# Patient Record
(demographics unavailable — no encounter records)

---

## 2025-02-07 NOTE — PHYSICAL EXAM
[FreeTextEntry1] : Constitutional: In NAD, calm and cooperative Heart: well perfused Lungs: nonlabored breathing MSK  Inspection: no gross swelling identified, no scars Palpation: Tenderness of the bilateral lower cervical paraspinals, upper traps, levator scap, rhomboids, lower lumbar paraspinals, QLs and gluteus muscles,  focal areas of hyperirritable, palpable, taut bands of muscles that reproduce pain referral pattern and twitch response with palpation ROM: Pain at end cervical rotation and sidebending, lumbar flexion and rotation  Strength: 5/5 strength in bilateral upper and lower extremities Reflexes: 2+ Patella reflex bilaterally, 2+ Achilles reflex bilaterally, negative clonus bilaterally Sensation: Intact to light touch in bilateral lower extremities Special tests: Spurling's: neg BL SLR: neg BL MARY: neg BL FADIR: neg BL Nilda's Finger: neg BL Facet loading: neg BL

## 2025-02-07 NOTE — ASSESSMENT
[FreeTextEntry1] : Mr. MARIA COULTER is a 46 year male who presents with chronic neck and low back pain. No focal deficits on exam. Reviewed with patient that the likely etiology of his pain is due to myofascial pain. At this time, lumbar/gluteal pain is slightly worse than neck/upper shoulder pain. Patient is amenable to lumbar TPI today, and maximizing conservative management for his overall myofascial pain     Impression: low back pain  neck pain myofascial pain   Plan:  - ortho notes reviewed  - XR L spine reviewed  - TPI performed, procedure note above.  - PT referral provided, emphasized on compliance to HEP. - start celecoxib 100mg q12h PRN, rx sent - start methocarbamol 500mg BID PRN, rx sent.  - RTC in 1 week for cervical TPI    The patient expressed verbal understanding and is in agreement with the plan of care. All of the patient's questions and concerns were addressed during today's visit.

## 2025-02-07 NOTE — REVIEW OF SYSTEMS
[Fever] : no fever [Chills] : no chills [Chest Pain] : no chest pain [Shortness Of Breath] : no shortness of breath [FreeTextEntry9] : +back pain, +neck pain

## 2025-02-07 NOTE — HISTORY OF PRESENT ILLNESS
[FreeTextEntry1] : Mr. MARIA COULTER is a 46 year male who presents with back pain      HPI  02/06/2025 Location: low back  Onset: 1 year ago Provocation/Palliative: Quality: Radiation: Severity:  /10   Denies any associated numbness. Denies any associated leg weakness. Denies any loss of bowel/bladder control or any groin numbness.   Current pain medications:   Previous medications trialed:   Previous procedures relevant to complaint: Injections:   5/16/24 - lumbar/gluteal TPI (ortho) Surgery: none for spine or joints    Conservative therapy tried: Physical Therapy: HEP: Chiropractor: Acupuncture:     Diagnostic studies reviewed by me: XR L spine:

## 2025-02-07 NOTE — HISTORY OF PRESENT ILLNESS
[FreeTextEntry1] : Mr. MARIA COULTER is a 46 year male who presents with back pain   HPI 02/06/2025 Location: low back/glutes, neck  Onset: chronic for many years, patient served in [place], wore heavy gear. 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  Provocation/Palliative: worse with pulling, twisting, lifting.  Quality: tight, aching.  Radiation: nonradiating. Severity: 6-7/10  Denies any associated numbness. Denies any associated leg weakness. Denies any loss of bowel/bladder control or any groin numbness.  Current pain medications: -   Previous medications trialed: ibuprofen ketorolac   Previous procedures relevant to complaint: Injections: 5/16/24 - lumbar/gluteal TPI (ortho) - helpful  Surgery: none for spine or joints  Conservative therapy tried: Physical Therapy: + completed in the past for back pain  HEP: stretches and exercises at home  Chiropractor: none  Acupuncture: none    Diagnostic studies reviewed by me: XR L spine (orthopacs):  no fracture or listhesis, no significant degenerative changes. maintenance of lumbar lordotic curve.

## 2025-02-07 NOTE — ASSESSMENT
[FreeTextEntry1] : Mr. MARIA COULTER is a 46 year male who presents with chronic low back pain    Impression: low back pain   Plan:  - Ortho notes reviewed  - XR L spine reviewed  -     The patient expressed verbal understanding and is in agreement with the plan of care. All of the patient's questions and concerns were addressed during today's visit.

## 2025-02-07 NOTE — REVIEW OF SYSTEMS
[Fever] : no fever [Chills] : no chills [Chest Pain] : no chest pain [Shortness Of Breath] : no shortness of breath [FreeTextEntry9] : +low back pain

## 2025-02-07 NOTE — PROCEDURE
[de-identified] : Procedure: Trigger point injection Preoperative Diagnosis: Myofascial Pain Post Procedure Diagnosis: Myofascial Pain Findings: Radiating trigger points Complications: None Anesthetic: bupivacaine 0.25% 0.5-1 ml per site Indication:  hx of myofascial pain.  Informed consent and Time Out performed prior to initiation of procedure. Technical details: Sterilization with alcohol to skin sites. Injection into the trigger point with 1.25 inch 27G needle using needling technique to elicit twitch response. Muscle groups: bilateral lumbar paraspinals, QL and gluteal muscles  Total injections: 10 Pre-procedure pain: 8 Post-procedure pain: 1 Dispo: The patient tolerated the procedure well.

## 2025-02-07 NOTE — PHYSICAL EXAM
[FreeTextEntry1] : Constitutional: In NAD, calm and cooperative Heart: well perfused Lungs: nonlabored breathing MSK (Back) Inspection: no gross swelling identified, no scars Palpation: Tenderness of the bilateral lower lumbar paraspinals ROM: Pain at end lumbar extension/flexion Strength: 5/5 strength in bilateral lower extremities Reflexes: 2+ Patella reflex bilaterally, 2+ Achilles reflex bilaterally, negative clonus bilaterally Sensation: Intact to light touch in bilateral lower extremities Special tests: SLR: MARY: FADIR: Pelvic Compression:  Thigh Thrust: Nilda's Finger: Facet loading:

## 2025-03-11 NOTE — PROCEDURE
[de-identified] : Procedure: Trigger point injection Preoperative Diagnosis: Myofascial Pain Post Procedure Diagnosis: Myofascial Pain Findings: Radiating trigger points Complications: None Anesthetic: bupivacaine 0.25% 1 ml per site Indication:  hx of myofascial pain.  Informed consent and Time Out performed prior to initiation of procedure. Technical details: Sterilization with alcohol to skin sites. Injection into the trigger point with 1.25 inch 27G needle using needling technique to elicit twitch response. Muscle groups: bilateral lumbar paraspinals, QLs and glutes Total injections: 10 Pre-procedure pain: 7 Post-procedure pain: 1 Dispo: The patient tolerated the procedure well.

## 2025-03-11 NOTE — HISTORY OF PRESENT ILLNESS
[FreeTextEntry1] : Mr. MARIA COULTER is a 46 year male who presents with back pain and neck pain  Interval 03/11/2025  Mr. MARIA COULTER is a 46 year male presents for follow up for neck/low back pain. At last visit cervical TPI was performed, PT, methocarbamol and celecoxib were continued. He states that neck pain has improved, feels tightness, but not as sharp and with improved ROM. He states that over the past 1 week, low back/gluteal pain returned to 7-8/10 due to increased life stressors. Pain is aggravated by heavy lifting, twisting, pulling that he performs at his job as a . He has been attending PT and doing his home exercises. He takes celecoxib and methocarbamol in the mornings, and only takes the second dose as needed. Denies any associated numbness. Denies any associated leg weakness. Denies any loss of bowel/bladder control or any groin numbness.    Interval 02/11/2025  Mr. MARIA COULTER is a 46 year male who presents for follow up for neck and low back pain. At last visit, lumbar/gluteal TPI was performed. Celecoxib and methocarbamol were started. He reports significant improvement of his bilateral gluteal pain since TPI, and is able to tolerate stretching and exercising. He is tolerating the medications and thinks they are helping his pain as well.  Patient presents today for chronic neck and upper shoulder pain. Pain has been chronic for years, has recently been worsening due to heavy lifting, twisting, pulling that he performs at his job as a . Neck and upper shoulders feel tight and achy, pain is nonradiating, 6-7/10. Denies any associated numbness. Denies any associated leg weakness. Denies any loss of bowel/bladder control or any groin numbness.   HPI 02/07/2025 Location: low back/glutes, neck  Onset: chronic for many years, patient served in [place], wore heavy gear. 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  Provocation/Palliative: worse with pulling, twisting, lifting.  Quality: tight, aching.  Radiation: nonradiating. Severity: 6-7/10  Denies any associated numbness. Denies any associated leg weakness. Denies any loss of bowel/bladder control or any groin numbness.  Current pain medications: celecoxib 100mg BID PRN methocarbamol 500mg BID PRN   Previous medications trialed: ibuprofen ketorolac   Previous procedures relevant to complaint: Injections:   2/11/25 - cervical TPI    2/7/25 - lumbar/gluteal TPI    5/16/24 - lumbar/gluteal TPI (ortho) - helpful  Surgery: none for spine or joints  Conservative therapy tried: Physical Therapy: + currently attending  HEP: stretches and exercises at home  Chiropractor: none  Acupuncture: none    Diagnostic studies reviewed by me: XR L spine (orthopacs):  no fracture or listhesis, no significant degenerative changes. maintenance of lumbar lordotic curve.

## 2025-03-11 NOTE — ASSESSMENT
[FreeTextEntry1] : Mr. MARIA COULTER is a 46 year male who presents with chronic neck and low back pain. No focal deficits on exam. Reviewed with patient that the likely etiology of his pain is due to myofascial pain.  He reports ongoing improvement of neck/upper shoulder pain after last TPI.   Low back/gluteal pain returned over the past 1 week despite adherence to PT/HEP and meds      Impression: low back pain  gluteal tendinopathy  neck pain myofascial pain   Plan:  - TPI performed, procedure note above.  - PT referral previously provided, emphasized on compliance to HEP. - continue celecoxib 100mg q12h PRN, rx sent - continue methocarbamol 500mg BID PRN, rx sent.  - RTC in 4-6 weeks   The patient expressed verbal understanding and is in agreement with the plan of care. All of the patient's questions and concerns were addressed during today's visit.

## 2025-04-01 NOTE — PROCEDURE
[de-identified] : Procedure: Trigger point injection Preoperative Diagnosis: Myofascial Pain Post Procedure Diagnosis: Myofascial Pain Findings: Radiating trigger points Complications: None Anesthetic: lidocaine 1% 9cc + methylprednisolone 40mg/mL 1cc = total injectate 10cc  Indication:  hx of myofascial pain.  Informed consent and Time Out performed prior to initiation of procedure. Technical details: Sterilization with alcohol to skin sites. Injection into the trigger point with 1.25 inch 27G needle using needling technique to elicit twitch response. Muscle groups: bilateral gluteus medius  Total injections: 8 Pre-procedure pain: 8 Post-procedure pain: 1 Dispo: The patient tolerated the procedure well.

## 2025-04-01 NOTE — PHYSICAL EXAM
[No Acute Distress] : no acute distress [Well Nourished] : well nourished [Well Developed] : well developed [Well-Appearing] : well-appearing [Normal Sclera/Conjunctiva] : normal sclera/conjunctiva [PERRL] : pupils equal round and reactive to light [EOMI] : extraocular movements intact [Normal Outer Ear/Nose] : the outer ears and nose were normal in appearance [Normal Oropharynx] : the oropharynx was normal [No Respiratory Distress] : no respiratory distress  [No Accessory Muscle Use] : no accessory muscle use [Clear to Auscultation] : lungs were clear to auscultation bilaterally [Normal Rate] : normal rate  [Regular Rhythm] : with a regular rhythm [Normal S1, S2] : normal S1 and S2 [No Murmur] : no murmur heard [No Edema] : there was no peripheral edema [Soft] : abdomen soft [Non Tender] : non-tender [Non-distended] : non-distended [No Masses] : no abdominal mass palpated [Normal Bowel Sounds] : normal bowel sounds [No CVA Tenderness] : no CVA  tenderness [Speech Grossly Normal] : speech grossly normal

## 2025-04-01 NOTE — PHYSICAL EXAM
[FreeTextEntry1] : Constitutional: In NAD, calm and cooperative Heart: well perfused Lungs: nonlabored breathing MSK  Inspection: no gross swelling identified, no scars Palpation: minimal tenderness at lumbar paraspinals, ++TTP over gluteus medius bilaterally,  focal areas of hyperirritable, palpable, taut bands of muscles that reproduce pain referral pattern and twitch response with palpation ROM: less pain with cervical and lumbar ROM Strength: 5/5 strength in bilateral upper and lower extremities Reflexes: 2+ Patella reflex bilaterally, 2+ Achilles reflex bilaterally, negative clonus bilaterally Sensation: Intact to light touch in bilateral lower extremities Special tests: Spurling's: neg BL SLR: neg BL MARY: neg BL FADIR: neg BL Nilda's Finger: neg BL Facet loading: neg BL

## 2025-04-01 NOTE — HISTORY OF PRESENT ILLNESS
[FreeTextEntry1] : f/u HTN / HLD  [de-identified] : 47 yo M w PMHx ADHD- stable on meds  HTN - reports BP is well controlled at home 130/70 Hyperlipidemia - trying to watch his diet  Chronic back pain - follows with spinal ortho - going for PT  is s/p trigger point injection - which is helping - Dr Thomas  Feels well and denies any acute complaints  Colonoscopy: 04/2023 - normal as per pt - rpt in 5 yrs

## 2025-04-01 NOTE — HISTORY OF PRESENT ILLNESS
[FreeTextEntry1] : Mr. MARIA COULTER is a 46 year male who presents with back pain and neck pain  Interval 04/01/2025  Mr. MARIA COULTER is a 46 year male presents for follow up for neck and low back pain. At last visit, lumbar TPI was performed. He notes significant improvement in his mid low back pain after the injection. He has ongoing improvement of his neck and shoulder pain. He has been consistent with his PT/HEP and medications. His overall neck and low back myofascial pain as 2-3/10 today. He notes that since last visit, his BL gluteal tendinopathy has flared up and is interfering with his ability to complete all of his exercises, and interferes with his work as a . This pain is similar to the pain he had last year which resolved with gluteal TPI + steroid with Dr. BRIGGS.  Denies any associated numbness. Denies any associated leg weakness. Denies any loss of bowel/bladder control or any groin numbness.   Interval 03/11/2025  Mr. MARIA COULTER is a 46 year male presents for follow up for neck/low back pain. At last visit cervical TPI was performed, PT, methocarbamol and celecoxib were continued. He states that neck pain has improved, feels tightness, but not as sharp and with improved ROM. He states that over the past 1 week, low back/gluteal pain returned to 7-8/10 due to increased life stressors. Pain is aggravated by heavy lifting, twisting, pulling that he performs at his job as a . He has been attending PT and doing his home exercises. He takes celecoxib and methocarbamol in the mornings, and only takes the second dose as needed. Denies any associated numbness. Denies any associated leg weakness. Denies any loss of bowel/bladder control or any groin numbness.   Interval 02/11/2025  Mr. MARIA COULTER is a 46 year male who presents for follow up for neck and low back pain. At last visit, lumbar/gluteal TPI was performed. Celecoxib and methocarbamol were started. He reports significant improvement of his bilateral gluteal pain since TPI, and is able to tolerate stretching and exercising. He is tolerating the medications and thinks they are helping his pain as well.  Patient presents today for chronic neck and upper shoulder pain. Pain has been chronic for years, has recently been worsening due to heavy lifting, twisting, pulling that he performs at his job as a . Neck and upper shoulders feel tight and achy, pain is nonradiating, 6-7/10. Denies any associated numbness. Denies any associated leg weakness. Denies any loss of bowel/bladder control or any groin numbness.   HPI 02/07/2025 Location: low back/glutes, neck  Onset: chronic for many years, patient served in [place], wore heavy gear. 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  Provocation/Palliative: worse with pulling, twisting, lifting.  Quality: tight, aching.  Radiation: nonradiating. Severity: 6-7/10  Denies any associated numbness. Denies any associated leg weakness. Denies any loss of bowel/bladder control or any groin numbness.  Current pain medications: celecoxib 100mg BID PRN methocarbamol 500mg BID PRN   Previous medications trialed: ibuprofen ketorolac   Previous procedures relevant to complaint: Injections:   3/11/25 - lumbar/gluteal TPI    2/11/25 - cervical TPI    2/7/25 - lumbar/gluteal TPI    5/16/24 - lumbar/gluteal TPI (ortho) - helpful  Surgery: none for spine or joints  Conservative therapy tried: Physical Therapy: + currently attending  HEP: stretches and exercises at home  Chiropractor: none  Acupuncture: none    Diagnostic studies reviewed by me: XR L spine (orthopacs):  no fracture or listhesis, no significant degenerative changes. maintenance of lumbar lordotic curve.

## 2025-04-01 NOTE — ASSESSMENT
[FreeTextEntry1] : Mr. MARIA COULTER is a 46 year male who presents with chronic neck and low back pain. No focal deficits on exam. Reviewed with patient that the likely etiology of his pain is due to myofascial pain.  He reports ongoing improvement of neck/upper shoulder pain and midline low back pain after last TPI.   Bilateral gluteal pain persists despite adherence to PT, HEP and medications.      Impression: gluteal tendinopathy  low back pain  neck pain myofascial pain   Plan:  - TPI performed, procedure note above.  - PT referral previously provided, emphasized on compliance to HEP. - continue celecoxib 100mg q12h PRN, refill sent - continue methocarbamol 500mg BID PRN, refill sent.  - RTC in 6-8 weeks    The patient expressed verbal understanding and is in agreement with the plan of care. All of the patient's questions and concerns were addressed during today's visit.

## 2025-07-28 NOTE — PROCEDURE
[de-identified] : Procedure: Trigger point injection Preoperative Diagnosis: Myofascial Pain Post Procedure Diagnosis: Myofascial Pain Findings: Radiating trigger points Complications: None Anesthetic: lidocaine 1% 10cc  Indication:  hx of myofascial pain.  Informed consent and Time Out performed prior to initiation of procedure. Technical details: Sterilization with alcohol to skin sites. Injection into the trigger point with 1.25 inch 27G needle using needling technique to elicit twitch response. Muscle groups: bilateral lumbar paraspinals and BL gluteus medius  Total injections: 10 Pre-procedure pain: 8 Post-procedure pain: 1 Dispo: The patient tolerated the procedure well.

## 2025-07-28 NOTE — HISTORY OF PRESENT ILLNESS
[FreeTextEntry1] : Mr. MARIA COULTER is a 46 year male who presents with back pain and neck pain   Interval 07/28/2025  Mr. MARIA COULTER is a 46 year male who presents for follow up for neck and low back pain. At last visit, BL lumbar and gluteal TPI was performed. Methocarbamol and celecoxib were continued. He states that methocarbamol has not been effective for his pain recently. He has continued PT and is compliant with his HEP. His pain is worse in the mornings, where he feels tight and stiff 8-10/10. Denies any radiation down his LE. Denies any associated numbness. Denies any associated leg weakness. Denies any loss of bowel/bladder control or any groin numbness.   Interval 04/01/2025  Mr. MARIA COULTER is a 46 year male presents for follow up for neck and low back pain. At last visit, lumbar TPI was performed. He notes significant improvement in his mid low back pain after the injection. He has ongoing improvement of his neck and shoulder pain. He has been consistent with his PT/HEP and medications. His overall neck and low back myofascial pain as 2-3/10 today. He notes that since last visit, his BL gluteal tendinopathy has flared up and is interfering with his ability to complete all of his exercises, and interferes with his work as a . This pain is similar to the pain he had last year which resolved with gluteal TPI + steroid with Dr. BRIGGS.  Denies any associated numbness. Denies any associated leg weakness. Denies any loss of bowel/bladder control or any groin numbness.   Interval 03/11/2025  Mr. MARIA COULTER is a 46 year male presents for follow up for neck/low back pain. At last visit cervical TPI was performed, PT, methocarbamol and celecoxib were continued. He states that neck pain has improved, feels tightness, but not as sharp and with improved ROM. He states that over the past 1 week, low back/gluteal pain returned to 7-8/10 due to increased life stressors. Pain is aggravated by heavy lifting, twisting, pulling that he performs at his job as a . He has been attending PT and doing his home exercises. He takes celecoxib and methocarbamol in the mornings, and only takes the second dose as needed. Denies any associated numbness. Denies any associated leg weakness. Denies any loss of bowel/bladder control or any groin numbness.   Interval 02/11/2025  Mr. MARIA COULTER is a 46 year male who presents for follow up for neck and low back pain. At last visit, lumbar/gluteal TPI was performed. Celecoxib and methocarbamol were started. He reports significant improvement of his bilateral gluteal pain since TPI, and is able to tolerate stretching and exercising. He is tolerating the medications and thinks they are helping his pain as well.  Patient presents today for chronic neck and upper shoulder pain. Pain has been chronic for years, has recently been worsening due to heavy lifting, twisting, pulling that he performs at his job as a . Neck and upper shoulders feel tight and achy, pain is nonradiating, 6-7/10. Denies any associated numbness. Denies any associated leg weakness. Denies any loss of bowel/bladder control or any groin numbness.   HPI 02/07/2025 Location: low back/glutes, neck  Onset: chronic for many years, patient served in [place], wore heavy gear. 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  Provocation/Palliative: worse with pulling, twisting, lifting.  Quality: tight, aching.  Radiation: nonradiating. Severity: 6-7/10  Denies any associated numbness. Denies any associated leg weakness. Denies any loss of bowel/bladder control or any groin numbness.  Current pain medications: celecoxib 100mg BID PRN - causes GI upset.  methocarbamol 500mg/1000mg BID PRN - less helpful now  Previous medications trialed: ibuprofen ketorolac   Previous procedures relevant to complaint: Injections:   4/1/25 -  lumbar/gluteal TPI    3/11/25 - lumbar/gluteal TPI    2/11/25 - cervical TPI    2/7/25 - lumbar/gluteal TPI    5/16/24 - lumbar/gluteal TPI (ortho) - helpful  Surgery: none for spine or joints  Conservative therapy tried: Physical Therapy: + currently attending  HEP: stretches and exercises at home  Chiropractor: none  Acupuncture: none    Diagnostic studies reviewed by me: XR L spine (orthopacs):  no fracture or listhesis, no significant degenerative changes. maintenance of lumbar lordotic curve.

## 2025-07-28 NOTE — ASSESSMENT
[FreeTextEntry1] : Mr. MARIA COULTER is a 46 year male who presents with chronic neck and low back pain. No focal deficits on exam. Reviewed with patient that the likely etiology of his pain is due to myofascial pain.  He reports ongoing improvement of neck/upper shoulder pain and midline low back pain after last TPI.   Bilateral gluteal pain persists despite adherence to PT, HEP and medications.      Impression: gluteal tendinopathy  low back pain  neck pain myofascial pain   Plan:  - TPI performed, procedure note above.  - PT referral previously provided, emphasized on compliance to HEP. - continue celecoxib 100mg q12h PRN, no refill needed, patient takes sparingly.  - stop methocarbamol - Start cyclobenzaprine 5 mg tablets, 1-2 tablets PO BID PRN pain, Rx sent.  Patient denies a history of CHF, liver dysfunction or arrhythmias.   Patient warned about the sedative nature of this medication and recommended not to drive or to handle heavy machinery - medical massage therapy referral provided.  - RTC in 3 months   The patient expressed verbal understanding and is in agreement with the plan of care. All of the patient's questions and concerns were addressed during today's visit.